# Patient Record
Sex: FEMALE | Race: ASIAN | URBAN - METROPOLITAN AREA
[De-identification: names, ages, dates, MRNs, and addresses within clinical notes are randomized per-mention and may not be internally consistent; named-entity substitution may affect disease eponyms.]

---

## 2019-01-22 ENCOUNTER — OFFICE VISIT (OUTPATIENT)
Dept: FAMILY MEDICINE CLINIC | Facility: CLINIC | Age: 59
End: 2019-01-22

## 2019-01-22 VITALS
RESPIRATION RATE: 16 BRPM | OXYGEN SATURATION: 98 % | BODY MASS INDEX: 24.19 KG/M2 | WEIGHT: 120 LBS | SYSTOLIC BLOOD PRESSURE: 123 MMHG | DIASTOLIC BLOOD PRESSURE: 81 MMHG | HEIGHT: 59 IN | HEART RATE: 77 BPM | TEMPERATURE: 98.4 F

## 2019-01-22 DIAGNOSIS — B34.9 ACUTE VIRAL SYNDROME: Primary | ICD-10-CM

## 2019-01-22 NOTE — PROGRESS NOTES
History and Physical 
 
Today's Date:  2019 Patient's Name: Tashia Lund Patient's :  1960 History: Chief Complaint Patient presents with  Flu  
 
Tashia Lund is a 64 y.o. female presenting for initial visit to [facility]. Will obtain records from previous provider to review. Care team updated on connect care. Flu-like symptoms This is a chronic problem, new to me. This is not at goal. This has been present for four days. Pt takes allegra and alleve-flu. Pt reports compliance with these medicatios. History reviewed. No pertinent past medical history. History reviewed. No pertinent surgical history. History reviewed. No pertinent family history. No Known Allergies Problem List:  
 There is no problem list on file for this patient. Medications: No current outpatient medications on file. No current facility-administered medications for this visit. Review of Systems:  
(Positives in bold) General:   fevers, chills Neurologic: dizziness, lightheadedness Eyes:  vision changes, double vision, photophobia Ears:  change in hearing, ear pain, ear discharge, ear ringing Nose:  sneezing, runny nose Mouth/Throat: sore throat, voice change Neck:  pain, stiffness Respiratory: dyspnea at rest, dyspnea on exertion, wheezing, cough, sputum production Cardiovascular:   chest pain, palpitations Breasts: lumps, discharge, pain, rash Gastrointestinal:  nausea, vomiting Urinary: dysuria, urinary frequency, nocturia, malodorous urine Genital (F): vaginal discharge, ulcerations Musculoskeletal:  joint pain, back pain Psychiatric: insomnia, anxiety, depression Endocrine: cold intolerance, heat intolerance Hematologic: easy bruising, easy bleeding Dermatologic: Itching, rash Physical Assessment:  
VS:   
Visit Vitals /81 (BP 1 Location: Left arm, BP Patient Position: Sitting) Pulse 77 Temp 98.4 °F (36.9 °C) (Oral) Resp 16  
 Ht 4' 11\" (1.499 m) Wt 120 lb (54.4 kg) SpO2 98% BMI 24.24 kg/m² General:   Well-groomed, well-nourished, in no distress, pleasant, alert, appropriate and conversant. Eyes:    PERRL, EOMI Ears:  TMs normal, no ear wax Mouth:  MMM, good dentition, oropharynx WNL without membranes, exudates, petechiae or ulcers Neck:   Neck supple, no swelling, mass or tenderness Cardiovascular:   No JVD. RRR, no MRG. Pulmonary:   Lungs clear bilaterally. Normal respiratory effort. Abdomen:   Abdomen soft, NT, ND, NAB Extremities:   No edema, LEs warm and well-perfused. Neuro:   Alert and oriented, no focal deficits. No facial asymmetry noted. Skin:    No rash or jaundice MSK:   Normal ROM, 5/5 muscle strength Psych:  No pressured speech or abnormal thought content PHQ over the last two weeks 2019 Little interest or pleasure in doing things Not at all Feeling down, depressed, irritable, or hopeless Not at all Total Score PHQ 2 0 Assessment/Plan & Orders: ICD-10-CM ICD-9-CM 1. Acute viral syndrome B34.9 079.99 Healthy lifestyle has been encouraged including avoidance of tobacco, limiting or avoiding alcohol intake, heart healthy diet which is low in cholesterol and saturated fat and contains fresh fruits, vegetables and whole grains and fiber, regular exercise with goals of 20-30 minutes 3-5 days weekly and maintaining an optimal BMI Pt declined testing Pt is outside the time frame to receive tamiflu Depression screenin19 Follow-up Disposition: 
Return if symptoms worsen or fail to improve. *Patient verbalized understanding and agreement with the plan. Patient was given an after-visit summary. Lilia Glass. Arlin Castillo MD - Internal Medicine 2019, 4:36 PM 
[facility] [address] [facility], [address] Phone [phone] Fax [phone]

## 2019-01-22 NOTE — LETTER
1/22/2019 4:38 PM 
 
Ms. Christian Meeks [facility] 2. [address] [facility] To Whom It May Concern: 
 
Christian Gould is currently under the care of [address] [place] [address]. Pt has a viral syndrome, resembling a flu like illness. If there are questions or concerns please have the patient contact our office. Sincerely, Karyle Rakers, MD

## 2019-01-22 NOTE — PROGRESS NOTES
Omi Cantor is 64 y.o. female presents today for office visit for follow up for flu symptoms. Pt is not fasting. Pt is in Room# 3.  
 
1. Have you been to the ER, urgent care clinic since your last visit? Hospitalized since your last visit? no 
2. Have you seen or consulted any other health care providers outside of the [address] [place] [address] since your last visit? Include any pap smears or colon screening. no 
Health Maintenance reviewed. Upcoming Appts 
none Requested Prescriptions No prescriptions requested or ordered in this encounter Visit Vitals /81 (BP 1 Location: Left arm, BP Patient Position: Sitting) Pulse 77 Temp 98.4 °F (36.9 °C) (Oral) Resp 16 Ht 4' 11\" (1.499 m) Wt 120 lb (54.4 kg) SpO2 98% BMI 24.24 kg/m²

## 2019-01-23 NOTE — PATIENT INSTRUCTIONS
Fatigue: Care Instructions Your Care Instructions Fatigue is a feeling of tiredness, exhaustion, or lack of energy. You may feel fatigue because of too much or not enough activity. It can also come from stress, lack of sleep, boredom, and poor diet. Many medical problems, such as viral infections, can cause fatigue. Emotional problems, especially depression, are often the cause of fatigue. Fatigue is most often a symptom of another problem. Treatment for fatigue depends on the cause. For example, if you have fatigue because you have a certain health problem, treating this problem also treats your fatigue. If depression or anxiety is the cause, treatment may help. Follow-up care is a key part of your treatment and safety. Be sure to make and go to all appointments, and call your doctor if you are having problems. It's also a good idea to know your test results and keep a list of the medicines you take. How can you care for yourself at home? · Get regular exercise. But don't overdo it. Go back and forth between rest and exercise. · Get plenty of rest. 
· Eat a healthy diet. Do not skip meals, especially breakfast. 
· Reduce your use of caffeine, tobacco, and alcohol. Caffeine is most often found in coffee, tea, cola drinks, and chocolate. · Limit medicines that can cause fatigue. This includes tranquilizers and cold and allergy medicines. When should you call for help? Watch closely for changes in your health, and be sure to contact your doctor if: 
  · You have new symptoms such as fever or a rash.  
  · Your fatigue gets worse.  
  · You have been feeling down, depressed, or hopeless. Or you may have lost interest in things that you usually enjoy.  
  · You are not getting better as expected. Where can you learn more? Go to http://nino-raaz.info/. Enter X500 in the search box to learn more about \"Fatigue: Care Instructions. \" Current as of: September 23, 2018 Content Version: 11.9 © 8658-8849 [employer]. Care instructions adapted under license by "[employer] (which disclaims liability or warranty for this information). If you have questions about a medical condition or this instruction, always ask your healthcare professional. [address] any warranty or liability for your use of this information.